# Patient Record
Sex: MALE | URBAN - METROPOLITAN AREA
[De-identification: names, ages, dates, MRNs, and addresses within clinical notes are randomized per-mention and may not be internally consistent; named-entity substitution may affect disease eponyms.]

---

## 2022-09-22 ENCOUNTER — OFFICE VISIT (OUTPATIENT)
Dept: URBAN - METROPOLITAN AREA CLINIC 35 | Facility: CLINIC | Age: 82
End: 2022-09-22
Payer: MEDICARE

## 2022-09-22 VITALS
HEIGHT: 69 IN | WEIGHT: 166 LBS | BODY MASS INDEX: 24.59 KG/M2 | SYSTOLIC BLOOD PRESSURE: 138 MMHG | DIASTOLIC BLOOD PRESSURE: 70 MMHG | HEART RATE: 64 BPM | OXYGEN SATURATION: 95 %

## 2022-09-22 DIAGNOSIS — K60.2 ANAL FISSURE: ICD-10-CM

## 2022-09-22 DIAGNOSIS — K62.5 RECTAL BLEEDING: ICD-10-CM

## 2022-09-22 DIAGNOSIS — K64.8 OTHER HEMORRHOIDS: ICD-10-CM

## 2022-09-22 DIAGNOSIS — Z86.010 PERSONAL HISTORY OF COLONIC POLYPS: ICD-10-CM

## 2022-09-22 PROCEDURE — 99204 OFFICE O/P NEW MOD 45 MIN: CPT | Performed by: PHYSICIAN ASSISTANT

## 2022-09-22 RX ORDER — NIFEDIPINE
PEA SIZE AMOUNT POWDER (GRAM) MISCELLANEOUS
Qty: 30 GRAMS | Refills: 1 | OUTPATIENT
Start: 2022-09-22 | End: 2022-11-20

## 2022-09-22 NOTE — HPI-RECTAL PAIN
80 y/o male patient who presents today for a consultation about rectal pain and itching.  Symptoms started to get worse the last couple of days and happens about 3-4 times a week.  He admits blood, but denies mucus, or melena in stools.  The blood is bright and is present on tissue and not enough to stain the water red. Patient takes Metamucil every day, if not he will get constipated. Patient currently admits 1 bowel movement per day with strain.  Patient denies any associated symptoms of abdominal cramping/pain, bloating, gas, or  heartburn.  Pt has history of anal fissure, and will occasionally bother him.  Recently, he has had increase in discomfort even without BM and also some bleeding.   Patient admits having a colonoscopy in the past. Patient recently had colonoscopy in 2021 with findings of 5 polyps.   Patient denies having EGD in the past Patient denies a family hx of colon, gastric, or esophageal cancer/polyps. Patient has had complete hemorrhoidectomy.

## 2022-10-13 ENCOUNTER — DASHBOARD ENCOUNTERS (OUTPATIENT)
Age: 82
End: 2022-10-13

## 2022-10-13 ENCOUNTER — OFFICE VISIT (OUTPATIENT)
Dept: URBAN - METROPOLITAN AREA CLINIC 35 | Facility: CLINIC | Age: 82
End: 2022-10-13
Payer: MEDICARE

## 2022-10-13 VITALS
OXYGEN SATURATION: 96 % | SYSTOLIC BLOOD PRESSURE: 128 MMHG | WEIGHT: 186 LBS | HEART RATE: 64 BPM | HEIGHT: 69 IN | DIASTOLIC BLOOD PRESSURE: 76 MMHG | BODY MASS INDEX: 27.55 KG/M2

## 2022-10-13 DIAGNOSIS — K62.5 RECTAL BLEEDING: ICD-10-CM

## 2022-10-13 DIAGNOSIS — K64.8 OTHER HEMORRHOIDS: ICD-10-CM

## 2022-10-13 DIAGNOSIS — K60.2 ANAL FISSURE: ICD-10-CM

## 2022-10-13 DIAGNOSIS — Z86.010 PERSONAL HISTORY OF COLONIC POLYPS: ICD-10-CM

## 2022-10-13 PROBLEM — 428283002: Status: ACTIVE | Noted: 2022-09-22

## 2022-10-13 PROCEDURE — 99214 OFFICE O/P EST MOD 30 MIN: CPT | Performed by: PHYSICIAN ASSISTANT

## 2022-10-13 RX ORDER — NIFEDIPINE
PEA SIZE AMOUNT POWDER (GRAM) MISCELLANEOUS
Qty: 30 GRAMS | Refills: 1 | Status: ACTIVE | COMMUNITY
Start: 2022-09-22 | End: 2022-11-20

## 2022-10-13 NOTE — HPI-RECTAL PAIN
Patient presents today for a follow up of rectal pain. He admits starting NIFEdipine Ointment and Preparation H with some relief. Currently reports 1 bowel movement per day, with occasional strain. His stools are formed. He denies any mucus, melena or blood in stools.   Last visit (9/22/2022): 82 y/o male patient who presents today for a consultation about rectal pain and itching.  Symptoms started to get worse the last couple of days and happens about 3-4 times a week.  He admits blood, but denies mucus, or melena in stools.  The blood is bright and is present on tissue and not enough to stain the water red. Patient takes Metamucil every day, if not he will get constipated. Patient currently admits 1 bowel movement per day with strain.  Patient denies any associated symptoms of abdominal cramping/pain, bloating, gas, or  heartburn.  Pt has history of anal fissure, and will occasionally bother him.  Recently, he has had increase in discomfort even without BM and also some bleeding.   Patient admits having a colonoscopy in the past. Patient recently had colonoscopy in 2021 with findings of 5 polyps.   Patient denies having EGD in the past Patient denies a family hx of colon, gastric, or esophageal cancer/polyps. Patient has had complete hemorrhoidectomy.